# Patient Record
Sex: FEMALE | Race: WHITE | NOT HISPANIC OR LATINO | Employment: FULL TIME | URBAN - METROPOLITAN AREA
[De-identification: names, ages, dates, MRNs, and addresses within clinical notes are randomized per-mention and may not be internally consistent; named-entity substitution may affect disease eponyms.]

---

## 2017-06-02 ENCOUNTER — HISTORICAL (OUTPATIENT)
Dept: RADIOLOGY | Facility: HOSPITAL | Age: 52
End: 2017-06-02

## 2019-06-18 ENCOUNTER — HISTORICAL (OUTPATIENT)
Dept: RADIOLOGY | Facility: HOSPITAL | Age: 54
End: 2019-06-18

## 2021-07-09 ENCOUNTER — HISTORICAL (OUTPATIENT)
Dept: RADIOLOGY | Facility: HOSPITAL | Age: 56
End: 2021-07-09

## 2021-08-12 ENCOUNTER — HISTORICAL (OUTPATIENT)
Dept: RADIOLOGY | Facility: HOSPITAL | Age: 56
End: 2021-08-12

## 2022-06-24 ENCOUNTER — HOSPITAL ENCOUNTER (OUTPATIENT)
Dept: RADIOLOGY | Facility: HOSPITAL | Age: 57
Discharge: HOME | End: 2022-06-24
Attending: FAMILY MEDICINE
Payer: COMMERCIAL

## 2022-06-24 DIAGNOSIS — Z12.31 BREAST CANCER SCREENING BY MAMMOGRAM: ICD-10-CM

## 2022-06-24 PROCEDURE — 77063 MAMMO DIGITAL SCREENING BILAT WITH TOMO: ICD-10-PCS | Mod: 26,,, | Performed by: RADIOLOGY

## 2022-06-24 PROCEDURE — 77067 SCR MAMMO BI INCL CAD: CPT | Mod: 26,,, | Performed by: RADIOLOGY

## 2022-06-24 PROCEDURE — 77067 MAMMO DIGITAL SCREENING BILAT WITH TOMO: ICD-10-PCS | Mod: 26,,, | Performed by: RADIOLOGY

## 2022-06-24 PROCEDURE — 77063 BREAST TOMOSYNTHESIS BI: CPT | Mod: 26,,, | Performed by: RADIOLOGY

## 2022-06-24 PROCEDURE — 77067 SCR MAMMO BI INCL CAD: CPT | Mod: TC

## 2023-11-01 DIAGNOSIS — J34.89 OTHER SPECIFIED DISORDERS OF NOSE AND NASAL SINUSES: Primary | ICD-10-CM

## 2023-11-06 ENCOUNTER — HOSPITAL ENCOUNTER (OUTPATIENT)
Dept: RADIOLOGY | Facility: HOSPITAL | Age: 58
Discharge: HOME | End: 2023-11-06
Attending: OTOLARYNGOLOGY
Payer: COMMERCIAL

## 2023-11-06 DIAGNOSIS — J34.89 OTHER SPECIFIED DISORDERS OF NOSE AND NASAL SINUSES: ICD-10-CM

## 2023-11-06 PROCEDURE — 70486 CT MAXILLOFACIAL W/O DYE: CPT | Mod: TC

## 2024-07-11 DIAGNOSIS — Z12.31 ENCOUNTER FOR SCREENING MAMMOGRAM FOR MALIGNANT NEOPLASM OF BREAST: Primary | ICD-10-CM

## 2024-07-15 ENCOUNTER — HOSPITAL ENCOUNTER (OUTPATIENT)
Dept: RADIOLOGY | Facility: HOSPITAL | Age: 59
Discharge: HOME | End: 2024-07-15
Attending: FAMILY MEDICINE
Payer: COMMERCIAL

## 2024-07-15 DIAGNOSIS — Z12.31 ENCOUNTER FOR SCREENING MAMMOGRAM FOR MALIGNANT NEOPLASM OF BREAST: ICD-10-CM

## 2024-07-15 PROCEDURE — 77063 BREAST TOMOSYNTHESIS BI: CPT | Mod: 26,,, | Performed by: STUDENT IN AN ORGANIZED HEALTH CARE EDUCATION/TRAINING PROGRAM

## 2024-07-15 PROCEDURE — 77067 SCR MAMMO BI INCL CAD: CPT | Mod: 26,,, | Performed by: STUDENT IN AN ORGANIZED HEALTH CARE EDUCATION/TRAINING PROGRAM

## 2024-07-15 PROCEDURE — 77063 BREAST TOMOSYNTHESIS BI: CPT | Mod: TC

## 2024-07-18 ENCOUNTER — HOSPITAL ENCOUNTER (OUTPATIENT)
Dept: RADIOLOGY | Facility: HOSPITAL | Age: 59
Discharge: HOME | End: 2024-07-18
Attending: FAMILY MEDICINE
Payer: COMMERCIAL

## 2024-07-18 DIAGNOSIS — M25.552 LEFT HIP PAIN: ICD-10-CM

## 2024-07-18 DIAGNOSIS — R93.89 ABNORMAL MAGNETIC RESONANCE IMAGING STUDY: Primary | ICD-10-CM

## 2024-07-18 DIAGNOSIS — M25.562 LEFT KNEE PAIN: ICD-10-CM

## 2024-07-18 PROCEDURE — 73562 X-RAY EXAM OF KNEE 3: CPT | Mod: TC,LT

## 2024-07-18 PROCEDURE — 73502 X-RAY EXAM HIP UNI 2-3 VIEWS: CPT | Mod: TC,LT

## 2024-08-05 ENCOUNTER — HOSPITAL ENCOUNTER (OUTPATIENT)
Dept: RADIOLOGY | Facility: HOSPITAL | Age: 59
Discharge: HOME | End: 2024-08-05
Attending: FAMILY MEDICINE
Payer: COMMERCIAL

## 2024-08-05 DIAGNOSIS — R93.89 ABNORMAL MAGNETIC RESONANCE IMAGING STUDY: ICD-10-CM

## 2024-08-05 PROCEDURE — 78815 PET IMAGE W/CT SKULL-THIGH: CPT | Mod: TC

## 2024-08-05 PROCEDURE — A9552 F18 FDG: HCPCS | Performed by: FAMILY MEDICINE

## 2024-08-05 RX ORDER — FLUDEOXYGLUCOSE F18 500 MCI/ML
10 INJECTION INTRAVENOUS
Status: COMPLETED | OUTPATIENT
Start: 2024-08-05 | End: 2024-08-05

## 2024-08-05 RX ADMIN — FLUDEOXYGLUCOSE F-18 10 MILLICURIE: 500 INJECTION INTRAVENOUS at 09:08

## 2024-09-19 ENCOUNTER — HOSPITAL ENCOUNTER (OUTPATIENT)
Dept: RADIOLOGY | Facility: HOSPITAL | Age: 59
Discharge: HOME | End: 2024-09-19
Attending: INTERNAL MEDICINE
Payer: COMMERCIAL

## 2024-09-19 DIAGNOSIS — J85.2 ABSCESS OF LOWER LOBE OF RIGHT LUNG WITHOUT PNEUMONIA: ICD-10-CM

## 2024-09-19 PROCEDURE — 71250 CT THORAX DX C-: CPT | Mod: TC

## 2024-12-30 ENCOUNTER — HOSPITAL ENCOUNTER (OUTPATIENT)
Dept: RADIOLOGY | Facility: HOSPITAL | Age: 59
Discharge: HOME | End: 2024-12-30
Attending: HOSPITALIST
Payer: COMMERCIAL

## 2024-12-30 DIAGNOSIS — R91.1 SOLITARY PULMONARY NODULE: ICD-10-CM

## 2024-12-30 DIAGNOSIS — R91.1 LUNG NODULE, SOLITARY: ICD-10-CM

## 2024-12-30 PROCEDURE — 71250 CT THORAX DX C-: CPT | Mod: TC

## 2025-03-19 ENCOUNTER — HOSPITAL ENCOUNTER (OUTPATIENT)
Dept: RADIOLOGY | Facility: HOSPITAL | Age: 60
Discharge: HOME | End: 2025-03-19
Attending: HOSPITALIST
Payer: COMMERCIAL

## 2025-03-19 DIAGNOSIS — R91.1 SOLITARY PULMONARY NODULE: ICD-10-CM

## 2025-03-19 PROCEDURE — 71250 CT THORAX DX C-: CPT | Mod: TC

## 2025-06-18 ENCOUNTER — HOSPITAL ENCOUNTER (OUTPATIENT)
Dept: RADIOLOGY | Facility: HOSPITAL | Age: 60
Discharge: HOME | End: 2025-06-18
Attending: HOSPITALIST
Payer: COMMERCIAL

## 2025-06-18 DIAGNOSIS — R91.1 SOLITARY PULMONARY NODULE: ICD-10-CM

## 2025-06-18 DIAGNOSIS — R91.8 LUNG MASS: ICD-10-CM

## 2025-06-18 PROCEDURE — 71250 CT THORAX DX C-: CPT | Mod: TC

## 2025-07-02 ENCOUNTER — LAB VISIT (OUTPATIENT)
Dept: LAB | Facility: HOSPITAL | Age: 60
End: 2025-07-02
Attending: NURSE PRACTITIONER
Payer: COMMERCIAL

## 2025-07-02 DIAGNOSIS — J85.2 ABSCESS OF LUNG WITHOUT PNEUMONIA, UNSPECIFIED LATERALITY: ICD-10-CM

## 2025-07-02 DIAGNOSIS — R91.1 PULMONARY NODULE: ICD-10-CM

## 2025-07-02 LAB — CRYPTOC AG SER QL IA.RAPID: NEGATIVE

## 2025-07-02 PROCEDURE — 36415 COLL VENOUS BLD VENIPUNCTURE: CPT

## 2025-07-02 PROCEDURE — 87449 NOS EACH ORGANISM AG IA: CPT

## 2025-07-02 PROCEDURE — 86612 BLASTOMYCES ANTIBODY: CPT

## 2025-07-02 PROCEDURE — 87899 AGENT NOS ASSAY W/OPTIC: CPT

## 2025-07-02 NOTE — H&P (VIEW-ONLY)
Subjective:     Chief Complaint: No chief complaint on file.      HPI: Ynes Mosqueda is a 59 y.o. female  female patient of Dr. Knapp who was referred to [facility] in 2024 for an abnormal calcium scoring CT of the chest revealing right lower lobe opacity.       She underwent a cardiac calcium scoring CT chest  2024 which showed a 2.5 cm pleural-based opacity in the right lower lobe posteriorly .  There is significant air within the lesion suggesting inflammation or infection.  Patient denies dental caries but does have chronic tonsillar calcification and issues with tonsillitis.     She has been a lifelong nonsmoker.  Her family history is pertinent for a father that  of lymphoma at age of 64 and a brother that  of age 50 from pancreatic cancer.     In 2024 she was placed on clindamycin 300 mg t.i.d. for 6 weeks    Today's visit:  Patient comes to the office today for follow up with repeat CT chest. She is feeling well. I reviewed the scans with Dr Hooper since Dr Knapp is out and the size has not changed but the mass has definitely become more solid.     Diagnostics:  .  2.5 cm consolidative posterior right lower lobe density with associated bronchiectasis   .  Mild increase in density of wedge-shaped opacity in the right lower lobe but of similar size measuring 1.7 x 2.5 x 3 cm   .  Slight interim increase in size and density in the posterior right lower lung nodule now measuring 3.5 x 2.9 cm  .  Unchanged wedge-shaped nodular pleural-based posterior right lower lobe mass measuring 3.6 cm.  . 3.1 cm wedge-shaped nodular focus again evident posterosuperior segment right lower lobe which has a similar appearance to the prior exam. Few other 2-4 mm subpleural nodules again evident at the lateral right upper lobe      Past Medical History:   Diagnosis Date    Essential (primary) hypertension     Hypothyroidism, unspecified     Mixed hyperlipidemia      Unspecified osteoarthritis, unspecified site          ROS        Social History[1]      Current Outpatient Medications   Medication Instructions    clindamycin (CLEOCIN) 300 mg, Oral, 3 times daily    levothyroxine (SYNTHROID) 75 mcg, Before breakfast    omega-3 fatty acids/fish oil (FISH OIL-OMEGA-3 FATTY ACIDS) 300-1,000 mg capsule Daily    red yeast rice 600 mg Cap Take by mouth.       Review of patient's allergies indicates:   Allergen Reactions    Iodamide        Objective:   There were no vitals taken for this visit.    Physical Exam  Vitals reviewed.   Constitutional:       Appearance: Normal appearance.   HENT:      Head: Normocephalic and atraumatic.   Cardiovascular:      Rate and Rhythm: Normal rate.      Heart sounds: Normal heart sounds.   Pulmonary:      Effort: Pulmonary effort is normal.      Breath sounds: Normal breath sounds.   Abdominal:      Palpations: Abdomen is soft.   Musculoskeletal:      Cervical back: Neck supple.   Neurological:      General: No focal deficit present.      Mental Status: She is alert and oriented to person, place, and time.           Imaging:  I have personally reviewed the pertinent recent imaging.    Assessment:     Right lower lobe opacity with air within the lesion likely representing infection or early lung abscess. (PET scan 8/24 with SUV of 5.9) initially seen in May of 2024 on a calcium CT scoring scan and is slightly where it measured 2-1/2 cm increased on subsequent scans with the latest measuring 3.6 cm.    Patient is a never smoker    Plan:     Long discussion with patient and family and Dr Hooper. We will obtain histo/blasto/coccidio labs and if negative proceed with biopsy of the mass.     ASHISH Wilkerson           [1]   Social History  Socioeconomic History    Marital status:    Tobacco Use    Smoking status: Never    Smokeless tobacco: Never     Social Drivers of Health     Financial Resource Strain: Patient Declined (8/6/2024)    Overall  Financial Resource Strain (CARDIA)     Difficulty of Paying Living Expenses: Patient declined   Food Insecurity: No Food Insecurity (8/6/2024)    Hunger Vital Sign     Worried About Running Out of Food in the Last Year: Never true     Ran Out of Food in the Last Year: Never true   Physical Activity: Insufficiently Active (8/6/2024)    Exercise Vital Sign     Days of Exercise per Week: 2 days     Minutes of Exercise per Session: 20 min   Stress: No Stress Concern Present (8/6/2024)    [employer] [place] [employer] - Occupational Stress Questionnaire     Feeling of Stress : Not at all   Housing Stability: Unknown (8/6/2024)    Housing Stability Vital Sign     Unable to Pay for Housing in the Last Year: No

## 2025-07-04 LAB
M HISTOPLASMA/BLASTOMYCES AG RESULT: NOT DETECTED
M HISTOPLASMA/BLASTOMYCES AG VALUE: NOT DETECTED NG/ML

## 2025-07-07 ENCOUNTER — ANESTHESIA EVENT (OUTPATIENT)
Dept: ENDOSCOPY | Facility: HOSPITAL | Age: 60
End: 2025-07-07
Payer: COMMERCIAL

## 2025-07-07 LAB — B DERMAT AB SER QL: NEGATIVE

## 2025-07-17 ENCOUNTER — HOSPITAL ENCOUNTER (OUTPATIENT)
Facility: HOSPITAL | Age: 60
Discharge: HOME | End: 2025-07-17
Attending: INTERNAL MEDICINE | Admitting: INTERNAL MEDICINE
Payer: COMMERCIAL

## 2025-07-17 ENCOUNTER — ANESTHESIA (OUTPATIENT)
Dept: ENDOSCOPY | Facility: HOSPITAL | Age: 60
End: 2025-07-17
Payer: COMMERCIAL

## 2025-07-17 ENCOUNTER — HOSPITAL ENCOUNTER (OUTPATIENT)
Dept: RADIOLOGY | Facility: HOSPITAL | Age: 60
Discharge: HOME | End: 2025-07-17
Attending: INTERNAL MEDICINE | Admitting: INTERNAL MEDICINE
Payer: COMMERCIAL

## 2025-07-17 DIAGNOSIS — R91.1 SOLITARY LUNG NODULE: Primary | ICD-10-CM

## 2025-07-17 PROCEDURE — 87070 CULTURE OTHR SPECIMN AEROBIC: CPT | Performed by: INTERNAL MEDICINE

## 2025-07-17 PROCEDURE — 27201423 OPTIME MED/SURG SUP & DEVICES STERILE SUPPLY: Performed by: INTERNAL MEDICINE

## 2025-07-17 PROCEDURE — 76000 FLUOROSCOPY <1 HR PHYS/QHP: CPT | Mod: TC

## 2025-07-17 PROCEDURE — 99900025 HC BRONCHOSCOPY-ASST (STAT)

## 2025-07-17 PROCEDURE — 99900035 HC TECH TIME PER 15 MIN (STAT)

## 2025-07-17 PROCEDURE — 31629 BRONCHOSCOPY/NEEDLE BX EACH: CPT | Performed by: INTERNAL MEDICINE

## 2025-07-17 PROCEDURE — 87206 SMEAR FLUORESCENT/ACID STAI: CPT | Performed by: INTERNAL MEDICINE

## 2025-07-17 PROCEDURE — 37000008 HC ANESTHESIA 1ST 15 MINUTES: Performed by: INTERNAL MEDICINE

## 2025-07-17 PROCEDURE — 63600175 PHARM REV CODE 636 W HCPCS: Performed by: NURSE ANESTHETIST, CERTIFIED REGISTERED

## 2025-07-17 PROCEDURE — 25000003 PHARM REV CODE 250: Performed by: NURSE ANESTHETIST, CERTIFIED REGISTERED

## 2025-07-17 PROCEDURE — 94002 VENT MGMT INPAT INIT DAY: CPT

## 2025-07-17 PROCEDURE — 31654 BRONCH EBUS IVNTJ PERPH LES: CPT | Performed by: INTERNAL MEDICINE

## 2025-07-17 PROCEDURE — 87116 MYCOBACTERIA CULTURE: CPT | Performed by: INTERNAL MEDICINE

## 2025-07-17 PROCEDURE — 31624 DX BRONCHOSCOPE/LAVAGE: CPT | Performed by: INTERNAL MEDICINE

## 2025-07-17 PROCEDURE — 37000009 HC ANESTHESIA EA ADD 15 MINS: Performed by: INTERNAL MEDICINE

## 2025-07-17 PROCEDURE — 31627 NAVIGATIONAL BRONCHOSCOPY: CPT | Performed by: INTERNAL MEDICINE

## 2025-07-17 PROCEDURE — 87102 FUNGUS ISOLATION CULTURE: CPT | Performed by: INTERNAL MEDICINE

## 2025-07-17 RX ORDER — ONDANSETRON HYDROCHLORIDE 2 MG/ML
INJECTION, SOLUTION INTRAVENOUS
Status: DISCONTINUED | OUTPATIENT
Start: 2025-07-17 | End: 2025-07-17

## 2025-07-17 RX ORDER — FENTANYL CITRATE 50 UG/ML
INJECTION, SOLUTION INTRAMUSCULAR; INTRAVENOUS
Status: DISCONTINUED | OUTPATIENT
Start: 2025-07-17 | End: 2025-07-17

## 2025-07-17 RX ORDER — LIDOCAINE HYDROCHLORIDE 10 MG/ML
1 INJECTION, SOLUTION EPIDURAL; INFILTRATION; INTRACAUDAL; PERINEURAL ONCE
Status: DISCONTINUED | OUTPATIENT
Start: 2025-07-17 | End: 2025-07-17 | Stop reason: HOSPADM

## 2025-07-17 RX ORDER — LIDOCAINE HYDROCHLORIDE 20 MG/ML
INJECTION, SOLUTION EPIDURAL; INFILTRATION; INTRACAUDAL; PERINEURAL
Status: DISCONTINUED | OUTPATIENT
Start: 2025-07-17 | End: 2025-07-17

## 2025-07-17 RX ORDER — LIDOCAINE HYDROCHLORIDE 10 MG/ML
1 INJECTION, SOLUTION EPIDURAL; INFILTRATION; INTRACAUDAL; PERINEURAL ONCE
OUTPATIENT
Start: 2025-07-17 | End: 2025-07-17

## 2025-07-17 RX ORDER — PROPOFOL 10 MG/ML
VIAL (ML) INTRAVENOUS CONTINUOUS PRN
Status: DISCONTINUED | OUTPATIENT
Start: 2025-07-17 | End: 2025-07-17

## 2025-07-17 RX ORDER — PROPOFOL 10 MG/ML
VIAL (ML) INTRAVENOUS
Status: DISCONTINUED | OUTPATIENT
Start: 2025-07-17 | End: 2025-07-17

## 2025-07-17 RX ORDER — SODIUM CHLORIDE, SODIUM GLUCONATE, SODIUM ACETATE, POTASSIUM CHLORIDE AND MAGNESIUM CHLORIDE 30; 37; 368; 526; 502 MG/100ML; MG/100ML; MG/100ML; MG/100ML; MG/100ML
INJECTION, SOLUTION INTRAVENOUS CONTINUOUS
Status: DISCONTINUED | OUTPATIENT
Start: 2025-07-17 | End: 2025-07-17 | Stop reason: HOSPADM

## 2025-07-17 RX ORDER — DEXAMETHASONE SODIUM PHOSPHATE 4 MG/ML
INJECTION, SOLUTION INTRA-ARTICULAR; INTRALESIONAL; INTRAMUSCULAR; INTRAVENOUS; SOFT TISSUE
Status: DISCONTINUED | OUTPATIENT
Start: 2025-07-17 | End: 2025-07-17

## 2025-07-17 RX ORDER — ROCURONIUM BROMIDE 10 MG/ML
INJECTION, SOLUTION INTRAVENOUS
Status: DISCONTINUED | OUTPATIENT
Start: 2025-07-17 | End: 2025-07-17

## 2025-07-17 RX ORDER — SODIUM CITRATE AND CITRIC ACID MONOHYDRATE 334; 500 MG/5ML; MG/5ML
30 SOLUTION ORAL
Status: DISCONTINUED | OUTPATIENT
Start: 2025-07-17 | End: 2025-07-17 | Stop reason: HOSPADM

## 2025-07-17 RX ORDER — SODIUM CHLORIDE, SODIUM GLUCONATE, SODIUM ACETATE, POTASSIUM CHLORIDE AND MAGNESIUM CHLORIDE 30; 37; 368; 526; 502 MG/100ML; MG/100ML; MG/100ML; MG/100ML; MG/100ML
INJECTION, SOLUTION INTRAVENOUS CONTINUOUS
OUTPATIENT
Start: 2025-07-17 | End: 2025-08-16

## 2025-07-17 RX ORDER — SODIUM CITRATE AND CITRIC ACID MONOHYDRATE 334; 500 MG/5ML; MG/5ML
30 SOLUTION ORAL
OUTPATIENT
Start: 2025-07-17

## 2025-07-17 RX ORDER — SODIUM CHLORIDE 9 MG/ML
INJECTION, SOLUTION INTRAVENOUS CONTINUOUS PRN
Status: DISCONTINUED | OUTPATIENT
Start: 2025-07-17 | End: 2025-07-17

## 2025-07-17 RX ADMIN — FENTANYL CITRATE 50 MCG: 50 INJECTION, SOLUTION INTRAMUSCULAR; INTRAVENOUS at 08:07

## 2025-07-17 RX ADMIN — PROPOFOL 50 MG: 10 INJECTION, EMULSION INTRAVENOUS at 08:07

## 2025-07-17 RX ADMIN — SUGAMMADEX 200 MG: 100 INJECTION, SOLUTION INTRAVENOUS at 08:07

## 2025-07-17 RX ADMIN — LIDOCAINE HYDROCHLORIDE 4 ML: 20 INJECTION, SOLUTION INTRAVENOUS at 08:07

## 2025-07-17 RX ADMIN — PROPOFOL 120 MCG/KG/MIN: 10 INJECTION, EMULSION INTRAVENOUS at 08:07

## 2025-07-17 RX ADMIN — DEXAMETHASONE SODIUM PHOSPHATE 4 MG: 4 INJECTION, SOLUTION INTRA-ARTICULAR; INTRALESIONAL; INTRAMUSCULAR; INTRAVENOUS; SOFT TISSUE at 08:07

## 2025-07-17 RX ADMIN — SODIUM CHLORIDE: 9 INJECTION, SOLUTION INTRAVENOUS at 07:07

## 2025-07-17 RX ADMIN — PROPOFOL 150 MG: 10 INJECTION, EMULSION INTRAVENOUS at 08:07

## 2025-07-17 RX ADMIN — ONDANSETRON 4 MG: 2 INJECTION INTRAMUSCULAR; INTRAVENOUS at 08:07

## 2025-07-17 RX ADMIN — ROCURONIUM BROMIDE 50 MG: 10 SOLUTION INTRAVENOUS at 08:07

## 2025-07-17 NOTE — TRANSFER OF CARE
"Anesthesia Transfer of Care Note    Patient: Ynes Mosqueda    Procedure(s) Performed: Procedure(s) (LRB):  BRONCHOSCOPY, NAVIGATIONAL (N/A)    Patient location: PACU    Anesthesia Type: general    Transport from OR: Transported from OR on room air with adequate spontaneous ventilation    Post pain: adequate analgesia    Post assessment: no apparent anesthetic complications and tolerated procedure well    Post vital signs: stable    Level of consciousness: awake and alert    Nausea/Vomiting: no nausea/vomiting    Complications: none    Transfer of care protocol was followed      Last vitals: Visit Vitals  /83   Pulse 70   Temp 36.9 °C (98.5 °F) (Oral)   Resp 18   Ht 5' 7" (1.702 m)   Wt 95.3 kg (210 lb)   LMP  (LMP Unknown)   SpO2 (!) 94%   Breastfeeding No   BMI 32.89 kg/m²     "

## 2025-07-17 NOTE — ANESTHESIA POSTPROCEDURE EVALUATION
Anesthesia Post Evaluation    Patient: Ynes Mosqueda    Procedure(s) Performed: Procedure(s) (LRB):  BRONCHOSCOPY, NAVIGATIONAL (N/A)    Final Anesthesia Type: general      Patient location during evaluation: PACU  Patient participation: Yes- Able to Participate  Post-procedure mental status: @ basline.  Pain management: adequate  [id number] ADRIANNE Mitigation: See pacu RN note for any measures applied.  PONV status: See postop meds for drugs used to control n/v if any.  Anesthetic complications: no      Cardiovascular status: stable  Respiratory status: @ baseline.  Hydration status: euvolemic                Vitals Value Taken Time   /68 07/17/25 09:39   Temp 36.4 °C (97.6 °F) 07/17/25 09:39   Pulse 62 07/17/25 09:39   Resp 17 07/17/25 09:39   SpO2 100 % 07/17/25 09:39         No case tracking events are documented in the log.      Pain/Elham Score: Elham Score: 10 (7/17/2025  9:39 AM)

## 2025-07-17 NOTE — PROCEDURES
[facility]  Bronchoscopy Procedure Note    SUMMARY     Date of Procedure: 7/17/2025    Procedure: Bronchoscopy, Diagnostic  Bronchoalveolar lavage BAL in LLL, Ion robotic/navigational bronchoscopy, right lower lobe needle biopsy    Performed by: Tyler Mansfield MD    Pre-Procedure Diagnosis:  Lung mass    Post-Procedure Diagnosis:  Same    Anesthesia: General Anesthesia        Description of the Findings of the Procedure:     Patient was consented for the procedure with all risk and benefit of the procedure explained in detail.  Patient was given the opportunity to ask questions and all concerns were answered.  The bronchocope was inserted into the main airway via the endotracheal tube. An anatomical survey was done of the main airways and the subsegmental bronchi.  There were no endobronchial lesions however there were scattered dale like conglomeration of mucus scattered throughout the airways but greater in the left lower lobe.  At the end of the procedure the regular bronchoscope was ultimately passed again and BAL was performed in the left lower lobe because of this.  In the meanwhile, the regular scope was withdrawn and the patient was docked to the ion robotic bronchoscopy system.  The pre generated navigational plan was loaded into the robot.  After registration was achieved, we navigated out to the right lower lobe lesion.  It was identifiable on fluoroscopy as well as radial EBUS .  Needle biopsies were obtained and sent for rapid pathology which returned findings probable for granulomatous inflammation.  The robotic bronchoscope was withdrawn and as noted previously, regular scope was inserted and BAL was performed in the left lower lobe with a total of 60 cc of saline administered and 20 cc of return.      Complications: None; patient tolerated the procedure well.    Estimated Blood Loss (EBL): Minimal           Specimens:  Right lower lobe needle biopsy and left lower lobe BAL        Condition: Good        Disposition: PACU - hemodynamically stable.  Patient will be recovered initially in the PACU and then outpatient surgery.  When she is discharged per protocol for only restriction will be no operation of heavy machinery or driving a motor vehicle for the rest of the day.  She can immediately resume all medications and her regular diet.  As of tomorrow morning she has absolutely no restrictions.  Once results are available patient will be contacted and appropriate follow up or referrals will be made    Tyler Mansfield MD  [facility]

## 2025-07-17 NOTE — ANESTHESIA PROCEDURE NOTES
Intubation    Date/Time: 7/17/2025 8:08 AM    Performed by: Elmer Alfaro CRNA  Authorized by: Tres Rolle MD    Intubation:     Induction:  Intravenous    Intubated:  Postinduction    Mask Ventilation:  Easy with oral airway    Attempts:  1    Attempted By:  Student (Intubated by [facility])    Method of Intubation:  Direct    Blade:  Katelyn 3    Laryngeal View Grade: Grade I - full view of cords      Difficult Airway Encountered?: No      Complications:  None    Airway Device:  Oral endotracheal tube    Airway Device Size:  8.5    Style/Cuff Inflation:  Cuffed (inflated to minimal occlusive pressure)    Inflation Amount (mL):  6    Tube secured:  22    Secured at:  The lips    Placement Verified By:  Capnometry    Complicating Factors:  None    Findings Post-Intubation:  BS equal bilateral

## 2025-07-17 NOTE — ANESTHESIA PREPROCEDURE EVALUATION
"07/17/2025    Ynes Mosqueda is a 59 y.o., female with medical problems noted below    Pre-operative evaluation for Procedure(s) (LRB):  BRONCHOSCOPY, NAVIGATIONAL (N/A)    Pre-op Assessment    I have reviewed the Patient Summary Reports.     I have reviewed the Nursing Notes. I have reviewed the NPO Status.   I have reviewed the Medications.     Review of Systems  Anesthesia Hx:  No problems with previous Anesthesia                Cardiovascular:  Exercise tolerance: good                                                 Past Medical History:   Diagnosis Date    Essential (primary) hypertension     Heart palpitations     Hypothyroidism, unspecified     Leaky heart valve     Mixed hyperlipidemia     Obesity, unspecified     Solitary lung nodule     Unspecified osteoarthritis, unspecified site        Problem List[1]    Review of patient's allergies indicates:   Allergen Reactions    Iodamide     Iodine Itching     topical    Shellfish containing products Other (See Comments)     Only when touching. Peels skin.       Current Outpatient Medications   Medication Instructions    clindamycin (CLEOCIN) 300 mg, Oral, 3 times daily    co Q10-red yeast rice  mg Cap 1 tablet, Oral, Daily    folic acid/multivit,iron, (ONE DAILY FOR WOMEN ORAL) 1 tablet, Oral, Daily    GLUCOSAMINE-CHONDROITIN-VIT D3 ORAL 1 tablet, Oral, Daily    levothyroxine (SYNTHROID) 75 mcg, Oral, Before breakfast    omega-3 fatty acids/fish oil (FISH OIL-OMEGA-3 FATTY ACIDS) 300-1,000 mg capsule 1 capsule, Oral, Daily    turmeric (CURCUMIN MISC) 1 tablet, Oral, Daily       Past Surgical History:   Procedure Laterality Date    COLONOSCOPY      HEMORRHOID SURGERY  2005    REPAIR, RETINAL DETACHMENT Right     TUBAL LIGATION         Social History[2]    Ht 5' 7" (1.702 m)   Wt 95.3 kg (210 lb)   LMP  (LMP Unknown)   Breastfeeding No   BMI 32.89 kg/m²       Physical Exam  General: Well nourished and Cooperative    Airway:  Mallampati: II   Mouth " Opening: Normal  TM Distance: Normal  Tongue: Normal  Neck ROM: Normal ROM    Dental:  Intact    Chest/Lungs:  Clear to auscultation    Heart:  Rhythm: Regular Rhythm            Anesthesia Plan  Type of Anesthesia, risks & benefits discussed:    Anesthesia Type: Gen Supraglottic Airway, Gen ETT  Intra-op Monitoring Plan: Standard ASA Monitors  Post Op Pain Control Plan: multimodal analgesia  Induction:  IV  Informed Consent: Informed consent signed with the Patient and all parties understand the risks and agree with anesthesia plan.  All questions answered.   ASA Score: 2  Day of Surgery Review of History & Physical: H&P Update referred to the surgeon/provider.    Ready For Surgery From Anesthesia Perspective.     .  Anesthesia consent includes material facts, risks, complications & alternatives, and possibility of altering the anesthesia plan due to intraoperative conditions.    I reviewed problem list, prior to admission medication list, appropriate labs, any workup, Xray, EKG etc   See anesthesia chart for details of the anesthesia plan carried out.       Tres Rolle MD    ¤                [1] There is no problem list on file for this patient.  [2]   Social History  Socioeconomic History    Marital status:    Tobacco Use    Smoking status: Never    Smokeless tobacco: Never   Substance and Sexual Activity    Alcohol use: Never    Drug use: Never     Social Drivers of Health     Financial Resource Strain: Patient Declined (8/6/2024)    Overall Financial Resource Strain (CARDIA)     Difficulty of Paying Living Expenses: Patient declined   Food Insecurity: No Food Insecurity (8/6/2024)    Hunger Vital Sign     Worried About Running Out of Food in the Last Year: Never true     Ran Out of Food in the Last Year: Never true   Physical Activity: Insufficiently Active (8/6/2024)    Exercise Vital Sign     Days of Exercise per Week: 2 days     Minutes of Exercise per Session: 20 min   Stress: No Stress Concern  Present (8/6/2024)    [facility] of Occupational [employer] - Occupational Stress Questionnaire     Feeling of Stress : Not at all   Housing Stability: Unknown (8/6/2024)    Housing Stability Vital Sign     Unable to Pay for Housing in the Last Year: No

## 2025-07-18 VITALS
HEIGHT: 67 IN | RESPIRATION RATE: 18 BRPM | SYSTOLIC BLOOD PRESSURE: 121 MMHG | HEART RATE: 60 BPM | OXYGEN SATURATION: 100 % | WEIGHT: 210 LBS | DIASTOLIC BLOOD PRESSURE: 85 MMHG | BODY MASS INDEX: 32.96 KG/M2 | TEMPERATURE: 98 F

## 2025-07-19 LAB
BACTERIA SPEC CULT: NO GROWTH
GRAM STN SPEC: NORMAL
GRAM STN SPEC: NORMAL

## 2025-07-20 LAB — RHODAMINE-AURAMINE STN SPEC: NORMAL

## 2025-07-21 ENCOUNTER — TELEPHONE (OUTPATIENT)
Dept: PULMONOLOGY | Facility: HOSPITAL | Age: 60
End: 2025-07-21
Payer: COMMERCIAL

## 2025-07-21 LAB — PSYCHE PATHOLOGY RESULT: NORMAL

## 2025-07-21 NOTE — TELEPHONE ENCOUNTER
Pathology from patient's bronchoscopy from July 17th shows no evidence of malignant or atypical cells.  There were histiocytes and evidence of granulomatous disease.  The above findings were discussed with the patient over the phone.  Likelihood of malignancy therefore is extremely low.    We will plan follow-up CT scan of the chest and office visit in 6 months.

## (undated) DEVICE — NDL FLEXISION BIOPSY 19G

## (undated) DEVICE — CONNECTOR SWIVEL